# Patient Record
Sex: FEMALE | Race: OTHER | NOT HISPANIC OR LATINO | Employment: UNEMPLOYED | URBAN - METROPOLITAN AREA
[De-identification: names, ages, dates, MRNs, and addresses within clinical notes are randomized per-mention and may not be internally consistent; named-entity substitution may affect disease eponyms.]

---

## 2023-10-03 ENCOUNTER — PREP FOR PROCEDURE (OUTPATIENT)
Dept: MATERNAL FETAL MEDICINE | Facility: CLINIC | Age: 23
End: 2023-10-03
Payer: COMMERCIAL

## 2023-10-03 ENCOUNTER — ANCILLARY PROCEDURE (OUTPATIENT)
Dept: RADIOLOGY | Facility: CLINIC | Age: 23
End: 2023-10-03
Payer: COMMERCIAL

## 2023-10-03 ENCOUNTER — ROUTINE PRENATAL (OUTPATIENT)
Dept: MATERNAL FETAL MEDICINE | Facility: CLINIC | Age: 23
End: 2023-10-03
Payer: COMMERCIAL

## 2023-10-03 ENCOUNTER — TELEPHONE (OUTPATIENT)
Dept: MATERNAL FETAL MEDICINE | Facility: HOSPITAL | Age: 23
End: 2023-10-03

## 2023-10-03 VITALS — SYSTOLIC BLOOD PRESSURE: 118 MMHG | DIASTOLIC BLOOD PRESSURE: 86 MMHG | WEIGHT: 177.2 LBS

## 2023-10-03 DIAGNOSIS — O33.7XX0: Primary | ICD-10-CM

## 2023-10-03 DIAGNOSIS — O33.7XX0: ICD-10-CM

## 2023-10-03 DIAGNOSIS — Z33.2 TERMINATION OF PREGNANCY (FETUS): Primary | ICD-10-CM

## 2023-10-03 PROCEDURE — 76811 OB US DETAILED SNGL FETUS: CPT

## 2023-10-03 PROCEDURE — 99203 OFFICE O/P NEW LOW 30 MIN: CPT | Performed by: OBSTETRICS & GYNECOLOGY

## 2023-10-03 PROCEDURE — 76811 OB US DETAILED SNGL FETUS: CPT | Performed by: OBSTETRICS & GYNECOLOGY

## 2023-10-03 PROCEDURE — 99213 OFFICE O/P EST LOW 20 MIN: CPT | Mod: 25 | Performed by: OBSTETRICS & GYNECOLOGY

## 2023-10-03 NOTE — ASSESSMENT & PLAN NOTE
Patient here for second opinion and termination in a pregnancy complicated by fetal hydrops. Patient had an amnio which was negative for CMV, parvo, toxo, and normal FISH. WGS pending. Limited ultrasound performed today demonstrates significant hydrops with a fetus measuring 2w ahead of GERTRUDE due to tissue edema.   Patient taught back her understanding of the findings including the risks of stillbirth,  demise. Patient understands her options of termination vs expectant management and desires termination. Patient desires a termination to start her healing process and has another child at home to take care of. Does not want to accept the risks of possible pregnancy complications with this pregnancy including mirror syndrome.

## 2023-10-03 NOTE — ASSESSMENT & PLAN NOTE
"Patient counseled on her options of continuing the pregnancy, terminating the pregnancy and adoption and she has decided to terminate the pregnancy, due to threat to maternal life.  I described the nature and purpose of the termination of pregnancy (\"\") procedure, the risks associated with that procedure (bleeding, infection, perforation of the uterus), the probable gestational age of the embryo or fetus, and the medical risks associated with carrying a pregnancy to term.  24 hour consent and HB 59 forms were filled out.  The patient was offered copies of the [facility] Fetal Development Book and Pregnancy Resource Guide.  She was also informed on the availability of post-termination resources and counseling.  Instructions for follow-up were reviewed with the patient.    Patient to be scheduled for Kcl and laminaria placement tomorrow.     "

## 2023-10-03 NOTE — PROGRESS NOTES
10/3/2023  Brigid Lacey     CONSULT NOTE  Referring Clinician: No ref. provider found   Reason for consult: Hydrops second opinion and termination    HPI: Brigid Lacey is a 23 y.o.  at 21w4d here for consult for fetal hydrops.     Patient reports finding out about fetal hydrops at her anatomic survey last week and had an amnio which was negative to date. She desires termination     10 point review of system is negative except as above    OB History    Para Term  AB Living   2 1   1   1   SAB IAB Ectopic Multiple Live Births           1      # Outcome Date GA Lbr Ozzie/2nd Weight Sex Delivery Anes PTL Lv   2 Current            1  21 36w4d  2693 g F Vag-Spont None  JENNIE       No past medical history on file.  Past Surgical History:   Procedure Laterality Date    NO PAST SURGERIES       Medication Documentation Review Audit    **Prior to Admission medications have not yet been reviewed**       Not on File  Social History     Socioeconomic History    Marital status: Single     Spouse name: Not on file    Number of children: Not on file    Years of education: Not on file    Highest education level: Not on file   Occupational History    Not on file   Tobacco Use    Smoking status: Never    Smokeless tobacco: Never   Substance and Sexual Activity    Alcohol use: Never    Drug use: Never    Sexual activity: Not on file   Other Topics Concern    Not on file   Social History Narrative    Not on file     Social Determinants of Health     Financial Resource Strain: Not on file   Food Insecurity: Not on file   Transportation Needs: Not on file   Physical Activity: Not on file   Stress: Not on file   Social Connections: Not on file   Intimate Partner Violence: Not on file     No family history on file.    OBJECTIVE  Visit Vitals  /86 Comment:    Wt 80.4 kg (177 lb 3.2 oz)   LMP 2023   OB Status Pregnant   Smoking Status Never     ASSESSMENT & PLAN    Hydrops fetalis  Patient here for  "second opinion and termination in a pregnancy complicated by fetal hydrops. Patient had an amnio which was negative for CMV, parvo, toxo, and normal FISH. WGS pending. Limited ultrasound performed today demonstrates significant hydrops with a fetus measuring 2w ahead of GERTRUDE due to tissue edema.   Patient taught back her understanding of the findings including the risks of stillbirth,  demise. Patient understands her options of termination vs expectant management and desires termination. Patient desires a termination to start her healing process and has another child at home to take care of. Does not want to accept the risks of possible pregnancy complications with this pregnancy including mirror syndrome.    Termination of pregnancy (fetus)  Patient counseled on her options of continuing the pregnancy, terminating the pregnancy and adoption and she has decided to terminate the pregnancy, due to threat to maternal life.  I described the nature and purpose of the termination of pregnancy (\"\") procedure, the risks associated with that procedure (bleeding, infection, perforation of the uterus), the probable gestational age of the embryo or fetus, and the medical risks associated with carrying a pregnancy to term.  24 hour consent and HB 59 forms were filled out.  The patient was offered copies of the [facility] Fetal Development Book and Pregnancy Resource Guide.  She was also informed on the availability of post-termination resources and counseling.  Instructions for follow-up were reviewed with the patient.       No orders of the defined types were placed in this encounter.   Case request order through prep for procedure was placed      Turner Ingram MD   Maternal Fetal Medicine    "

## 2023-10-04 ENCOUNTER — ROUTINE PRENATAL (OUTPATIENT)
Dept: MATERNAL FETAL MEDICINE | Facility: HOSPITAL | Age: 23
End: 2023-10-04
Payer: COMMERCIAL

## 2023-10-04 ENCOUNTER — HOSPITAL ENCOUNTER (OUTPATIENT)
Facility: HOSPITAL | Age: 23
Setting detail: OUTPATIENT SURGERY
End: 2023-10-04
Attending: OBSTETRICS & GYNECOLOGY | Admitting: OBSTETRICS & GYNECOLOGY
Payer: COMMERCIAL

## 2023-10-04 ENCOUNTER — PREP FOR PROCEDURE (OUTPATIENT)
Dept: MATERNAL FETAL MEDICINE | Facility: CLINIC | Age: 23
End: 2023-10-04
Payer: COMMERCIAL

## 2023-10-04 DIAGNOSIS — Z33.2 TERMINATION OF PREGNANCY (FETUS): ICD-10-CM

## 2023-10-04 DIAGNOSIS — Z33.2 TERMINATION OF PREGNANCY (FETUS): Primary | ICD-10-CM

## 2023-10-04 PROCEDURE — 99214 OFFICE O/P EST MOD 30 MIN: CPT | Performed by: OBSTETRICS & GYNECOLOGY

## 2023-10-04 NOTE — PROGRESS NOTES
Patient ID: Brigid Lacey is a 23 y.o. female.  Patient presents for fetal digoxin injection for fetal hydrops not compatible with life and laminaria placement ahead of plan for D&E.    Written consent was obtained electronically.  Time out was performed.    Fetus visualized via ultrasound.  Patient's abdomen was prepped and draped in the usual sterile fashion. 2 g of digoxin was injected into the fetal chest with a 20 gauge spinal needle under ultrasound guidance.  No complications noted.    Attention was then turned to the laminaria placement.  A speculum was placed in the vagina, the cervix was visualized and cleaned with betadine swabs.  An injection of 2 mLof 1% lidocaine was injected at 12 'o'clock and a tenaculum was placed.  Additional 4 mL at 2'o'clock and 4 mL at 10'o'clock were injected at which time the laminaria were placed.  Four laminaria were placed but bleeding was noted at the injection site and patient reported severe cramping and requested cessation of procedure.  Laminaria were expelled, all were removed from the vagina.  Bleeding evaluated, pressure was held and the cervix was hemostatic.  Discussed with patient option of moving forward with the laminaria versus induction of labor in lieu of D&E, patient decided on induction of labor and will present by tomorrow for initiation of IOL.  All instruments were removed and counts were correct.  Patient was stable for discharge home.  Dr. Singleton was present for the entire procedure.    Reviewed and approved by SUE GALVEZ on 10/4/23 at 5:50 PM.

## 2023-10-05 ENCOUNTER — HOSPITAL ENCOUNTER (INPATIENT)
Facility: HOSPITAL | Age: 23
LOS: 1 days | Discharge: HOME | DRG: 779 | End: 2023-10-06
Attending: OBSTETRICS & GYNECOLOGY | Admitting: STUDENT IN AN ORGANIZED HEALTH CARE EDUCATION/TRAINING PROGRAM
Payer: COMMERCIAL

## 2023-10-05 ENCOUNTER — DOCUMENTATION (OUTPATIENT)
Dept: CASE MANAGEMENT | Facility: HOSPITAL | Age: 23
End: 2023-10-05
Payer: COMMERCIAL

## 2023-10-05 DIAGNOSIS — Z33.2 TERMINATION OF PREGNANCY (FETUS): Primary | ICD-10-CM

## 2023-10-05 LAB
ABO GROUP (TYPE) IN BLOOD: NORMAL
ABO GROUP (TYPE) IN BLOOD: NORMAL
ANTIBODY SCREEN: NORMAL
BB ANTIBODY IDENTIFICATION: NORMAL
CASE #: NORMAL
ERYTHROCYTE [DISTWIDTH] IN BLOOD BY AUTOMATED COUNT: 15.9 % (ref 11.5–14.5)
HCT VFR BLD AUTO: 27.8 % (ref 36–46)
HGB BLD-MCNC: 9.2 G/DL (ref 12–16)
MCH RBC QN AUTO: 31 PG (ref 26–34)
MCHC RBC AUTO-ENTMCNC: 33.1 G/DL (ref 32–36)
MCV RBC AUTO: 94 FL (ref 80–100)
NRBC BLD-RTO: 0 /100 WBCS (ref 0–0)
PATH REV-IMMUNOHEMATOLOGY-PR30: NORMAL
PLATELET # BLD AUTO: 205 X10*3/UL (ref 150–450)
PMV BLD AUTO: 10.3 FL (ref 7.5–11.5)
RBC # BLD AUTO: 2.97 X10*6/UL (ref 4–5.2)
RH FACTOR (ANTIGEN D): NORMAL
RH FACTOR (ANTIGEN D): NORMAL
T PALLIDUM AB SER QL: NONREACTIVE
WBC # BLD AUTO: 8.6 X10*3/UL (ref 4.4–11.3)

## 2023-10-05 PROCEDURE — 86922 COMPATIBILITY TEST ANTIGLOB: CPT

## 2023-10-05 PROCEDURE — 1120000001 HC OB PRIVATE ROOM DAILY

## 2023-10-05 PROCEDURE — 2500000004 HC RX 250 GENERAL PHARMACY W/ HCPCS (ALT 636 FOR OP/ED): Performed by: STUDENT IN AN ORGANIZED HEALTH CARE EDUCATION/TRAINING PROGRAM

## 2023-10-05 PROCEDURE — 86870 RBC ANTIBODY IDENTIFICATION: CPT

## 2023-10-05 PROCEDURE — 85027 COMPLETE CBC AUTOMATED: CPT | Performed by: STUDENT IN AN ORGANIZED HEALTH CARE EDUCATION/TRAINING PROGRAM

## 2023-10-05 PROCEDURE — 86901 BLOOD TYPING SEROLOGIC RH(D): CPT | Performed by: STUDENT IN AN ORGANIZED HEALTH CARE EDUCATION/TRAINING PROGRAM

## 2023-10-05 PROCEDURE — 86900 BLOOD TYPING SEROLOGIC ABO: CPT | Performed by: STUDENT IN AN ORGANIZED HEALTH CARE EDUCATION/TRAINING PROGRAM

## 2023-10-05 PROCEDURE — 2580000001 HC RX 258 IV SOLUTIONS: Performed by: STUDENT IN AN ORGANIZED HEALTH CARE EDUCATION/TRAINING PROGRAM

## 2023-10-05 PROCEDURE — 86780 TREPONEMA PALLIDUM: CPT | Performed by: STUDENT IN AN ORGANIZED HEALTH CARE EDUCATION/TRAINING PROGRAM

## 2023-10-05 PROCEDURE — 36415 COLL VENOUS BLD VENIPUNCTURE: CPT | Performed by: STUDENT IN AN ORGANIZED HEALTH CARE EDUCATION/TRAINING PROGRAM

## 2023-10-05 PROCEDURE — 86077 PHYS BLOOD BANK SERV XMATCH: CPT | Performed by: STUDENT IN AN ORGANIZED HEALTH CARE EDUCATION/TRAINING PROGRAM

## 2023-10-05 RX ORDER — METOCLOPRAMIDE 10 MG/1
10 TABLET ORAL EVERY 6 HOURS PRN
Status: DISCONTINUED | OUTPATIENT
Start: 2023-10-05 | End: 2023-10-06 | Stop reason: HOSPADM

## 2023-10-05 RX ORDER — SODIUM CHLORIDE, SODIUM LACTATE, POTASSIUM CHLORIDE, CALCIUM CHLORIDE 600; 310; 30; 20 MG/100ML; MG/100ML; MG/100ML; MG/100ML
125 INJECTION, SOLUTION INTRAVENOUS CONTINUOUS
Status: DISCONTINUED | OUTPATIENT
Start: 2023-10-05 | End: 2023-10-06 | Stop reason: HOSPADM

## 2023-10-05 RX ORDER — TERBUTALINE SULFATE 1 MG/ML
0.25 INJECTION SUBCUTANEOUS ONCE AS NEEDED
Status: DISCONTINUED | OUTPATIENT
Start: 2023-10-05 | End: 2023-10-06 | Stop reason: HOSPADM

## 2023-10-05 RX ORDER — TRANEXAMIC ACID 100 MG/ML
1000 INJECTION, SOLUTION INTRAVENOUS ONCE AS NEEDED
Status: DISCONTINUED | OUTPATIENT
Start: 2023-10-05 | End: 2023-10-06 | Stop reason: HOSPADM

## 2023-10-05 RX ORDER — KETOROLAC TROMETHAMINE 15 MG/ML
30 INJECTION, SOLUTION INTRAMUSCULAR; INTRAVENOUS EVERY 6 HOURS PRN
Status: DISCONTINUED | OUTPATIENT
Start: 2023-10-05 | End: 2023-10-06 | Stop reason: HOSPADM

## 2023-10-05 RX ORDER — MISOPROSTOL 200 UG/1
400 TABLET ORAL
Status: DISPENSED | OUTPATIENT
Start: 2023-10-05 | End: 2023-10-06

## 2023-10-05 RX ORDER — HYDRALAZINE HYDROCHLORIDE 20 MG/ML
5 INJECTION INTRAMUSCULAR; INTRAVENOUS ONCE AS NEEDED
Status: DISCONTINUED | OUTPATIENT
Start: 2023-10-05 | End: 2023-10-06 | Stop reason: HOSPADM

## 2023-10-05 RX ORDER — OXYTOCIN 10 [USP'U]/ML
10 INJECTION, SOLUTION INTRAMUSCULAR; INTRAVENOUS ONCE AS NEEDED
Status: DISCONTINUED | OUTPATIENT
Start: 2023-10-05 | End: 2023-10-06 | Stop reason: HOSPADM

## 2023-10-05 RX ORDER — CARBOPROST TROMETHAMINE 250 UG/ML
250 INJECTION, SOLUTION INTRAMUSCULAR ONCE AS NEEDED
Status: COMPLETED | OUTPATIENT
Start: 2023-10-05 | End: 2023-10-06

## 2023-10-05 RX ORDER — LOPERAMIDE HYDROCHLORIDE 2 MG/1
4 CAPSULE ORAL EVERY 2 HOUR PRN
Status: DISCONTINUED | OUTPATIENT
Start: 2023-10-05 | End: 2023-10-06 | Stop reason: HOSPADM

## 2023-10-05 RX ORDER — OXYTOCIN/0.9 % SODIUM CHLORIDE 30/500 ML
60 PLASTIC BAG, INJECTION (ML) INTRAVENOUS
Status: DISCONTINUED | OUTPATIENT
Start: 2023-10-05 | End: 2023-10-06 | Stop reason: HOSPADM

## 2023-10-05 RX ORDER — METOCLOPRAMIDE HYDROCHLORIDE 5 MG/ML
10 INJECTION INTRAMUSCULAR; INTRAVENOUS EVERY 6 HOURS PRN
Status: DISCONTINUED | OUTPATIENT
Start: 2023-10-05 | End: 2023-10-06 | Stop reason: HOSPADM

## 2023-10-05 RX ORDER — MISOPROSTOL 200 UG/1
800 TABLET ORAL ONCE AS NEEDED
Status: DISCONTINUED | OUTPATIENT
Start: 2023-10-05 | End: 2023-10-06 | Stop reason: HOSPADM

## 2023-10-05 RX ORDER — HYDROMORPHONE HYDROCHLORIDE 1 MG/ML
0.4 INJECTION, SOLUTION INTRAMUSCULAR; INTRAVENOUS; SUBCUTANEOUS EVERY 2 HOUR PRN
Status: DISCONTINUED | OUTPATIENT
Start: 2023-10-05 | End: 2023-10-06 | Stop reason: HOSPADM

## 2023-10-05 RX ORDER — ONDANSETRON HYDROCHLORIDE 2 MG/ML
4 INJECTION, SOLUTION INTRAVENOUS EVERY 6 HOURS PRN
Status: DISCONTINUED | OUTPATIENT
Start: 2023-10-05 | End: 2023-10-06 | Stop reason: HOSPADM

## 2023-10-05 RX ORDER — NIFEDIPINE 10 MG/1
10 CAPSULE ORAL ONCE AS NEEDED
Status: DISCONTINUED | OUTPATIENT
Start: 2023-10-05 | End: 2023-10-06 | Stop reason: HOSPADM

## 2023-10-05 RX ORDER — ONDANSETRON 4 MG/1
4 TABLET, FILM COATED ORAL EVERY 6 HOURS PRN
Status: DISCONTINUED | OUTPATIENT
Start: 2023-10-05 | End: 2023-10-06 | Stop reason: HOSPADM

## 2023-10-05 RX ORDER — LIDOCAINE HYDROCHLORIDE 10 MG/ML
30 INJECTION INFILTRATION; PERINEURAL ONCE AS NEEDED
Status: DISCONTINUED | OUTPATIENT
Start: 2023-10-05 | End: 2023-10-06 | Stop reason: HOSPADM

## 2023-10-05 RX ORDER — LABETALOL HYDROCHLORIDE 5 MG/ML
20 INJECTION, SOLUTION INTRAVENOUS ONCE AS NEEDED
Status: DISCONTINUED | OUTPATIENT
Start: 2023-10-05 | End: 2023-10-06 | Stop reason: HOSPADM

## 2023-10-05 RX ORDER — METHYLERGONOVINE MALEATE 0.2 MG/ML
0.2 INJECTION INTRAVENOUS ONCE AS NEEDED
Status: DISCONTINUED | OUTPATIENT
Start: 2023-10-05 | End: 2023-10-06 | Stop reason: HOSPADM

## 2023-10-05 RX ADMIN — HYDROMORPHONE HYDROCHLORIDE 0.4 MG: 1 INJECTION, SOLUTION INTRAMUSCULAR; INTRAVENOUS; SUBCUTANEOUS at 08:59

## 2023-10-05 RX ADMIN — MISOPROSTOL 400 MCG: 200 TABLET ORAL at 12:03

## 2023-10-05 RX ADMIN — MISOPROSTOL 400 MCG: 200 TABLET ORAL at 03:46

## 2023-10-05 RX ADMIN — SODIUM CHLORIDE, POTASSIUM CHLORIDE, SODIUM LACTATE AND CALCIUM CHLORIDE 125 ML/HR: 600; 310; 30; 20 INJECTION, SOLUTION INTRAVENOUS at 03:49

## 2023-10-05 RX ADMIN — HYDROMORPHONE HYDROCHLORIDE 0.4 MG: 1 INJECTION, SOLUTION INTRAMUSCULAR; INTRAVENOUS; SUBCUTANEOUS at 18:17

## 2023-10-05 RX ADMIN — SODIUM CHLORIDE, POTASSIUM CHLORIDE, SODIUM LACTATE AND CALCIUM CHLORIDE 125 ML/HR: 600; 310; 30; 20 INJECTION, SOLUTION INTRAVENOUS at 21:28

## 2023-10-05 RX ADMIN — MISOPROSTOL 400 MCG: 200 TABLET ORAL at 16:19

## 2023-10-05 RX ADMIN — SODIUM CHLORIDE, POTASSIUM CHLORIDE, SODIUM LACTATE AND CALCIUM CHLORIDE 125 ML/HR: 600; 310; 30; 20 INJECTION, SOLUTION INTRAVENOUS at 11:12

## 2023-10-05 RX ADMIN — MISOPROSTOL 400 MCG: 200 TABLET ORAL at 07:57

## 2023-10-05 RX ADMIN — HYDROMORPHONE HYDROCHLORIDE 0.4 MG: 1 INJECTION, SOLUTION INTRAMUSCULAR; INTRAVENOUS; SUBCUTANEOUS at 12:08

## 2023-10-05 RX ADMIN — MISOPROSTOL 400 MCG: 200 TABLET ORAL at 21:59

## 2023-10-05 SDOH — HEALTH STABILITY: MENTAL HEALTH: HOW OFTEN DO YOU HAVE 6 OR MORE DRINKS ON ONE OCCASION?: NEVER

## 2023-10-05 SDOH — SOCIAL STABILITY: SOCIAL INSECURITY: PHYSICAL ABUSE: DENIES

## 2023-10-05 SDOH — HEALTH STABILITY: MENTAL HEALTH: HAVE YOU USED ANY PRESCRIPTION DRUGS OTHER THAN PRESCRIBED IN THE PAST 12 MONTHS?: NO

## 2023-10-05 SDOH — HEALTH STABILITY: MENTAL HEALTH: NON-SPECIFIC ACTIVE SUICIDAL THOUGHTS (PAST 1 MONTH): NO

## 2023-10-05 SDOH — ECONOMIC STABILITY: FOOD INSECURITY: WITHIN THE PAST 12 MONTHS, YOU WORRIED THAT YOUR FOOD WOULD RUN OUT BEFORE YOU GOT MONEY TO BUY MORE.: NEVER TRUE

## 2023-10-05 SDOH — SOCIAL STABILITY: SOCIAL INSECURITY: ARE THERE ANY APPARENT SIGNS OF INJURIES/BEHAVIORS THAT COULD BE RELATED TO ABUSE/NEGLECT?: NO

## 2023-10-05 SDOH — HEALTH STABILITY: MENTAL HEALTH: WERE YOU ABLE TO COMPLETE ALL THE BEHAVIORAL HEALTH SCREENINGS?: NO

## 2023-10-05 SDOH — HEALTH STABILITY: MENTAL HEALTH: HOW OFTEN DO YOU HAVE A DRINK CONTAINING ALCOHOL?: NEVER

## 2023-10-05 SDOH — HEALTH STABILITY: MENTAL HEALTH: HOW MANY STANDARD DRINKS CONTAINING ALCOHOL DO YOU HAVE ON A TYPICAL DAY?: PATIENT DOES NOT DRINK

## 2023-10-05 SDOH — SOCIAL STABILITY: SOCIAL INSECURITY
WITHIN THE LAST YEAR, HAVE TO BEEN RAPED OR FORCED TO HAVE ANY KIND OF SEXUAL ACTIVITY BY YOUR PARTNER OR EX-PARTNER?: NO

## 2023-10-05 SDOH — SOCIAL STABILITY: SOCIAL INSECURITY: DO YOU FEEL ANYONE HAS EXPLOITED OR TAKEN ADVANTAGE OF YOU FINANCIALLY OR OF YOUR PERSONAL PROPERTY?: NO

## 2023-10-05 SDOH — SOCIAL STABILITY: SOCIAL INSECURITY: WITHIN THE LAST YEAR, HAVE YOU BEEN AFRAID OF YOUR PARTNER OR EX-PARTNER?: NO

## 2023-10-05 SDOH — SOCIAL STABILITY: SOCIAL INSECURITY: WITHIN THE LAST YEAR, HAVE YOU BEEN HUMILIATED OR EMOTIONALLY ABUSED IN OTHER WAYS BY YOUR PARTNER OR EX-PARTNER?: NO

## 2023-10-05 SDOH — ECONOMIC STABILITY: FOOD INSECURITY: WITHIN THE PAST 12 MONTHS, THE FOOD YOU BOUGHT JUST DIDN'T LAST AND YOU DIDN'T HAVE MONEY TO GET MORE.: NEVER TRUE

## 2023-10-05 SDOH — SOCIAL STABILITY: SOCIAL INSECURITY
WITHIN THE LAST YEAR, HAVE YOU BEEN KICKED, HIT, SLAPPED, OR OTHERWISE PHYSICALLY HURT BY YOUR PARTNER OR EX-PARTNER?: NO

## 2023-10-05 SDOH — HEALTH STABILITY: MENTAL HEALTH: SUICIDAL BEHAVIOR (LIFETIME): NO

## 2023-10-05 SDOH — SOCIAL STABILITY: SOCIAL INSECURITY: ABUSE SCREEN: ADULT

## 2023-10-05 SDOH — SOCIAL STABILITY: SOCIAL INSECURITY: HAS ANYONE EVER THREATENED TO HURT YOUR FAMILY OR YOUR PETS?: NO

## 2023-10-05 SDOH — SOCIAL STABILITY: SOCIAL INSECURITY: ARE YOU OR HAVE YOU BEEN THREATENED OR ABUSED PHYSICALLY, EMOTIONALLY, OR SEXUALLY BY ANYONE?: NO

## 2023-10-05 SDOH — HEALTH STABILITY: MENTAL HEALTH: WISH TO BE DEAD (PAST 1 MONTH): NO

## 2023-10-05 SDOH — SOCIAL STABILITY: SOCIAL INSECURITY: VERBAL ABUSE: DENIES

## 2023-10-05 SDOH — ECONOMIC STABILITY: HOUSING INSECURITY: DO YOU FEEL UNSAFE GOING BACK TO THE PLACE WHERE YOU ARE LIVING?: NO

## 2023-10-05 SDOH — ECONOMIC STABILITY: TRANSPORTATION INSECURITY
IN THE PAST 12 MONTHS, HAS LACK OF TRANSPORTATION KEPT YOU FROM MEETINGS, WORK, OR FROM GETTING THINGS NEEDED FOR DAILY LIVING?: NO

## 2023-10-05 SDOH — ECONOMIC STABILITY: TRANSPORTATION INSECURITY
IN THE PAST 12 MONTHS, HAS THE LACK OF TRANSPORTATION KEPT YOU FROM MEDICAL APPOINTMENTS OR FROM GETTING MEDICATIONS?: NO

## 2023-10-05 SDOH — SOCIAL STABILITY: SOCIAL INSECURITY: DOES ANYONE TRY TO KEEP YOU FROM HAVING/CONTACTING OTHER FRIENDS OR DOING THINGS OUTSIDE YOUR HOME?: NO

## 2023-10-05 SDOH — HEALTH STABILITY: MENTAL HEALTH: HAVE YOU USED ANY SUBSTANCES (CANABIS, COCAINE, HEROIN, HALLUCINOGENS, INHALANTS, ETC.) IN THE PAST 12 MONTHS?: NO

## 2023-10-05 ASSESSMENT — LIFESTYLE VARIABLES
AUDIT-C TOTAL SCORE: 0
AUDIT-C TOTAL SCORE: 0
HOW OFTEN DO YOU HAVE 6 OR MORE DRINKS ON ONE OCCASION: NEVER
SKIP TO QUESTIONS 9-10: 1
HOW MANY STANDARD DRINKS CONTAINING ALCOHOL DO YOU HAVE ON A TYPICAL DAY: PATIENT DOES NOT DRINK
AUDIT-C TOTAL SCORE: 0
SKIP TO QUESTIONS 9-10: 1
HOW OFTEN DO YOU HAVE A DRINK CONTAINING ALCOHOL: NEVER

## 2023-10-05 ASSESSMENT — PAIN SCALES - GENERAL
PAINLEVEL_OUTOF10: 8
PAINLEVEL_OUTOF10: 3
PAINLEVEL_OUTOF10: 1
PAINLEVEL_OUTOF10: 0 - NO PAIN
PAINLEVEL_OUTOF10: 2
PAINLEVEL_OUTOF10: 8
PAINLEVEL_OUTOF10: 2
PAINLEVEL_OUTOF10: 8
PAINLEVEL_OUTOF10: 0 - NO PAIN
PAINLEVEL_OUTOF10: 2

## 2023-10-05 NOTE — H&P
Obstetrical Admission History and Physical     Brigid Lacey is a 23 y.o.  at 21w6d. GERTRUDE: 2024, by Last Menstrual Period presenting for IOL in the setting of fetal hydrops.    Chief Complaint: fetal loss    Assessment/Plan      IOL, IUFD  - Patient with fetal hydrops diagnosed on anatomy ultrasound with negative amniocentesis  - s/p MFM consult: given risk to maternal life risk of mirror syndrome, patient opted for termination. Now s/p fetal digoxin injection and unsuccessful laminaria placement, with patient now opting for IOL  - Bedside ultrasound with hydropic fetus in cephalic presentation, no cardiac activity visualized  - Patient is s/p genetic testing with amniocentesis with pending whole genome sequencing. Patient desires autopsy, undecided on disposition. Plan to contact mortician after delivery.   - Patient amenable to bereavement support with Mariel Camacho  - Cytotec 400mcg placed intravaginally, for repeat dose in 4 hours  - Discussed we do not recommend  given increased maternal risk associated with  vs vaginal delivery without benefit to fetus in this setting. Patient expressed understanding.  - Discussed increased risk for retained placenta and need for D&C after delivery  - Hgb 9.2 on admission, T&C 2u pRBC  - Patient uncertain about what she would like at delivery and whether she wants to hold Scarlet. Will continue to check in and reassess.    Principal Problem:    Fetal hydrops      Pregnancy Problems (from 10/03/23 to present)       Problem Noted Resolved    Hydrops fetalis 10/3/2023 by Turner Ingram MD No    Priority:  Medium      Overview Addendum 10/5/2023  2:57 AM by Latosha Wild MD     THANIA from [facility] where it was diagnosed at 20w.  Patient underwent an amnio: Negative for Parvo, CMV, Toxo, FISH 46XX  Desires termination           Termination of pregnancy (fetus) 10/3/2023 by Turner Ingram MD No    Priority:  Medium      Overview Signed 10/3/2023  3:37 PM by Turner  MD Nataly     Hydrops - skin edema, pleural effusion, pericardial effusion, ascites               Options for delivery have been discussed with the patient and she elects for an induction of labor.  Cervical ripening with cytotec, cervidil, other prostaglandin agents has been discussed.  The risks, benefits, complications, alternatives, expected outcomes, potential problems during recuperation and recovery, and the risks of not performing the procedure were discussed with the patient. All questions were answered. There was concurrence with the planned procedure, and the patient wanted to proceed    Admit to inpatient status. I anticipate that this patient will require a stay exceeding at least 2 midnights for delivery and postpartum.  Induction of labor.  Management of pregnancy complications, as indicated.    Subjective   Patient sent in by [facility] for IOL in the setting of fetal hydrops incompatible with life, s/p fetal digoxin injection. Coping appropriately.     Reason for Induction of Labor:  Fetal loss       Obstetrical History   OB History    Para Term  AB Living   2 1   1   1   SAB IAB Ectopic Multiple Live Births           1      # Outcome Date GA Lbr Ozzie/2nd Weight Sex Delivery Anes PTL Lv   2 Current            1  21 36w4d  2693 g F Vag-Spont None  JENNIE       Past Medical History  History reviewed. No pertinent past medical history.     Past Surgical History   Past Surgical History:   Procedure Laterality Date    NO PAST SURGERIES         Social History  Social History     Tobacco Use    Smoking status: Never    Smokeless tobacco: Never   Substance Use Topics    Alcohol use: Never     Substance and Sexual Activity   Drug Use Never       Allergies  Patient has no known allergies.     Medications  No medications prior to admission.       Objective    Last Vitals  Temp Pulse Resp BP MAP O2 Sat   36.7 °C (98.1 °F) 99 18 115/79   99 %     Physical Examination  General: no acute  "distress  HEENT: normocephalic, atraumatic  Heart: warm and well perfused  Lungs: clear to auscultation bilaterally  Abdomen: gravid  Extremities: moving all extremities  Neuro: awake and conversant  Psych: appropriate mood and affect  SVE: 0/0/-3  Bedside ultrasound with hydropic fetus in cephalic presentation, no cardiac activity visualized    Lab Review  No results found for: \"ABO\", \"LABRH\", \"ABSCRN\"  Lab Results   Component Value Date    WBC 8.6 10/05/2023    HGB 9.2 (L) 10/05/2023    HCT 27.8 (L) 10/05/2023     10/05/2023       "

## 2023-10-05 NOTE — PROGRESS NOTES
Intrapartum Progress Note    Assessment/Plan   Brigid Lacey is a 23 y.o.  at 21w6d. GERTRUDE: 2024, by Last Menstrual Period presenting for IOL in the setting of fetal hydrops.     IOL, IUFD  - Patient with fetal hydrops diagnosed on anatomy ultrasound with negative amniocentesis  - s/p MFM consult: given risk to maternal life risk of mirror syndrome, patient opted for termination. Now s/p fetal digoxin injection and unsuccessful laminaria placement, with patient now opting for IOL  - Bedside ultrasound with hydropic fetus in cephalic presentation, no cardiac activity visualized on admission   - Patient is s/p genetic testing with amniocentesis with pending whole genome sequencing. Patient desires autopsy, undecided on disposition. Plan to contact mortician after delivery.   - Patient amenable to bereavement support with Mariel Camacho  - Cytotec 400mcg placed intravaginally now x3 doses   - Discussed we do not recommend  given increased maternal risk associated with  vs vaginal delivery without benefit to fetus in this setting. Patient expressed understanding.  - Discussed increased risk for retained placenta and need for D&C after delivery  - Hgb 9.2 on admission, T&C 2u pRBC  - Patient uncertain about what she would like at delivery and whether she wants to hold Scarlet. Will continue to check in and reassess.   - pain control with toradol/dilaudid, neuraxial anesthesia as desired   - Bereavement Shellie Camacho consulted, appreciate care     Principal Problem:    Fetal hydrops    Pregnancy Problems (from 10/03/23 to present)       Problem Noted Resolved    Hydrops fetalis 10/3/2023 by Turner Ingram MD No    Priority:  Medium      Overview Addendum 10/5/2023  2:57 AM by Latosha Wild MD     THANIA from [facility] where it was diagnosed at 20w.  Patient underwent an amnio: Negative for Parvo, CMV, Toxo, FISH 46XX  Desires termination           Termination of pregnancy (fetus) 10/3/2023 by Turner Ingram MD  No    Priority:  Medium      Overview Signed 10/3/2023  3:37 PM by Turner Ingram MD     Hydrops - skin edema, pleural effusion, pericardial effusion, ascites                 Subjective   Feeling some cramping/pressure     Objective   Last Vitals:  Temp Pulse Resp BP MAP Pulse Ox   36.4 °C (97.5 °F) 108 18 127/75 96 mmHg 98 %     Vitals Min/Max Last 24 Hours:  Temp  Min: 36.4 °C (97.5 °F)  Max: 37.2 °C (99 °F)  Pulse  Min: 91  Max: 120  Resp  Min: 16  Max: 18  BP  Min: 113/72  Max: 127/75  MAP  Min: 93 mmHg  Max: 96 mmHg    Intake/Output:    Intake/Output Summary (Last 24 hours) at 10/5/2023 1453  Last data filed at 10/5/2023 1200  Gross per 24 hour   Intake 1022.92 ml   Output --   Net 1022.92 ml       Physical Examination:  GENERAL: Examination reveals a well developed, well nourished, gravid female in no acute distress. She is alert and cooperative.  HEENT: PERRLA. External ears normal. Nose normal, no erythema or discharge. Mouth and throat clear.  ABDOMEN: soft, gravid, nontender, nondistended, no abnormal masses, no epigastric pain  CERVIX: 1 cm dilated, 50% effaced, -3 station; MEMBRANES are Intact  EXTREMITIES: no redness or tenderness in the calves or thighs, no edema  SKIN: normal coloration and turgor, no rashes  NEUROLOGICAL: alert, oriented, normal speech, no focal findings or movement disorder noted  PSYCHOLOGICAL: awake and alert; oriented to person, place, and time    Lab Review:  Labs in chart were reviewed.

## 2023-10-05 NOTE — SIGNIFICANT EVENT
Patient states that she is feeling more discomfort, states that she has had an increase in cramping which she rates a 8/10 in pain. Requesting pain medication, hot packs also provided for additional pain relief. Significant other at bedside offering support, call light within reach.

## 2023-10-05 NOTE — PROGRESS NOTES
10/5/23: Received referral from OB resident, Dr. Wild, to see pt for bereavement support. Pt is a  who is admitted to L&D for IOL for IUFD secondary to fetal hydrops. She is 21.6 wga.   Met with Brigid today at bedside along with her partner. They also have a 2 year old daughter at home. Per Brigid, they have a lot of good support from their family. Brigid shared that they are extremely sad but trying to make it through today so they can start processing everything that has happened over the past week as they didn't know anything was wrong with the baby until her routine ultrasound last week. She shared that she feels like she is in a mental fog right now. Provided Brigid and her partner with support. Provided them with resources for their daughter at home as well as for themselves. Provided them with my direct contact information in order to follow up with them once they return home.    PLAN: Will remain available for support while inpatient and will follow for bereavement support/resources upon discharge home which pt is amenable to.    Mariel Camacho, MANJULA, KENZIE, CGP  [employer] [facility] [facility] Director of Parent Bereavement Programs  [phone]

## 2023-10-06 ENCOUNTER — DOCUMENTATION (OUTPATIENT)
Dept: CASE MANAGEMENT | Facility: HOSPITAL | Age: 23
End: 2023-10-06
Payer: COMMERCIAL

## 2023-10-06 VITALS
TEMPERATURE: 98.2 F | SYSTOLIC BLOOD PRESSURE: 119 MMHG | OXYGEN SATURATION: 98 % | HEART RATE: 94 BPM | BODY MASS INDEX: 29.49 KG/M2 | DIASTOLIC BLOOD PRESSURE: 77 MMHG | RESPIRATION RATE: 18 BRPM | HEIGHT: 65 IN

## 2023-10-06 LAB — FETAL MATERNAL SCREEN: NORMAL

## 2023-10-06 PROCEDURE — 2500000001 HC RX 250 WO HCPCS SELF ADMINISTERED DRUGS (ALT 637 FOR MEDICARE OP): Performed by: STUDENT IN AN ORGANIZED HEALTH CARE EDUCATION/TRAINING PROGRAM

## 2023-10-06 PROCEDURE — 96372 THER/PROPH/DIAG INJ SC/IM: CPT | Performed by: STUDENT IN AN ORGANIZED HEALTH CARE EDUCATION/TRAINING PROGRAM

## 2023-10-06 PROCEDURE — 59409 OBSTETRICAL CARE: CPT | Performed by: OBSTETRICS & GYNECOLOGY

## 2023-10-06 PROCEDURE — 85461 HEMOGLOBIN FETAL: CPT

## 2023-10-06 PROCEDURE — 7100000016 HC LABOR RECOVERY PER HOUR

## 2023-10-06 PROCEDURE — 88305 TISSUE EXAM BY PATHOLOGIST: CPT | Mod: TC,SUR,CMCLAB | Performed by: STUDENT IN AN ORGANIZED HEALTH CARE EDUCATION/TRAINING PROGRAM

## 2023-10-06 PROCEDURE — 7210000002 HC LABOR PER HOUR

## 2023-10-06 PROCEDURE — 36415 COLL VENOUS BLD VENIPUNCTURE: CPT | Performed by: STUDENT IN AN ORGANIZED HEALTH CARE EDUCATION/TRAINING PROGRAM

## 2023-10-06 PROCEDURE — 88305 TISSUE EXAM BY PATHOLOGIST: CPT | Performed by: STUDENT IN AN ORGANIZED HEALTH CARE EDUCATION/TRAINING PROGRAM

## 2023-10-06 PROCEDURE — 2500000004 HC RX 250 GENERAL PHARMACY W/ HCPCS (ALT 636 FOR OP/ED): Performed by: STUDENT IN AN ORGANIZED HEALTH CARE EDUCATION/TRAINING PROGRAM

## 2023-10-06 PROCEDURE — 10A07ZX ABORTION OF PRODUCTS OF CONCEPTION, ABORTIFACIENT, VIA NATURAL OR ARTIFICIAL OPENING: ICD-10-PCS | Performed by: STUDENT IN AN ORGANIZED HEALTH CARE EDUCATION/TRAINING PROGRAM

## 2023-10-06 PROCEDURE — 2500000005 HC RX 250 GENERAL PHARMACY W/O HCPCS: Performed by: STUDENT IN AN ORGANIZED HEALTH CARE EDUCATION/TRAINING PROGRAM

## 2023-10-06 RX ORDER — OXYTOCIN/0.9 % SODIUM CHLORIDE 30/500 ML
60 PLASTIC BAG, INJECTION (ML) INTRAVENOUS
Status: CANCELLED | OUTPATIENT
Start: 2023-10-06

## 2023-10-06 RX ORDER — ADHESIVE BANDAGE
10 BANDAGE TOPICAL
Status: CANCELLED | OUTPATIENT
Start: 2023-10-06

## 2023-10-06 RX ORDER — ONDANSETRON 4 MG/1
4 TABLET, FILM COATED ORAL
COMMUNITY
Start: 2022-08-08

## 2023-10-06 RX ORDER — LOPERAMIDE HYDROCHLORIDE 2 MG/1
4 CAPSULE ORAL EVERY 2 HOUR PRN
Status: CANCELLED | OUTPATIENT
Start: 2023-10-06

## 2023-10-06 RX ORDER — ONDANSETRON 4 MG/1
4 TABLET, FILM COATED ORAL EVERY 6 HOURS PRN
Status: CANCELLED | OUTPATIENT
Start: 2023-10-06

## 2023-10-06 RX ORDER — HYDRALAZINE HYDROCHLORIDE 20 MG/ML
5 INJECTION INTRAMUSCULAR; INTRAVENOUS ONCE AS NEEDED
Status: CANCELLED | OUTPATIENT
Start: 2023-10-06

## 2023-10-06 RX ORDER — LABETALOL HYDROCHLORIDE 5 MG/ML
20 INJECTION, SOLUTION INTRAVENOUS ONCE AS NEEDED
Status: CANCELLED | OUTPATIENT
Start: 2023-10-06

## 2023-10-06 RX ORDER — SIMETHICONE 80 MG
80 TABLET,CHEWABLE ORAL 4 TIMES DAILY PRN
Status: CANCELLED | OUTPATIENT
Start: 2023-10-06

## 2023-10-06 RX ORDER — LIDOCAINE 560 MG/1
1 PATCH PERCUTANEOUS; TOPICAL; TRANSDERMAL
Status: CANCELLED | OUTPATIENT
Start: 2023-10-06

## 2023-10-06 RX ORDER — DIPHENHYDRAMINE HCL 25 MG
25 CAPSULE ORAL EVERY 6 HOURS PRN
Status: CANCELLED | OUTPATIENT
Start: 2023-10-06

## 2023-10-06 RX ORDER — IBUPROFEN 600 MG/1
600 TABLET ORAL EVERY 6 HOURS SCHEDULED
Status: DISCONTINUED | OUTPATIENT
Start: 2023-10-06 | End: 2023-10-06 | Stop reason: HOSPADM

## 2023-10-06 RX ORDER — PETROLATUM,WHITE 41 %
OINTMENT (GRAM) TOPICAL
COMMUNITY
Start: 2022-11-15

## 2023-10-06 RX ORDER — DIPHENHYDRAMINE HYDROCHLORIDE 50 MG/ML
25 INJECTION INTRAMUSCULAR; INTRAVENOUS EVERY 6 HOURS PRN
Status: CANCELLED | OUTPATIENT
Start: 2023-10-06

## 2023-10-06 RX ORDER — CARBOPROST TROMETHAMINE 250 UG/ML
250 INJECTION, SOLUTION INTRAMUSCULAR ONCE
Status: COMPLETED | OUTPATIENT
Start: 2023-10-06 | End: 2023-10-06

## 2023-10-06 RX ORDER — NIFEDIPINE 10 MG/1
10 CAPSULE ORAL ONCE AS NEEDED
Status: CANCELLED | OUTPATIENT
Start: 2023-10-06

## 2023-10-06 RX ORDER — POLYETHYLENE GLYCOL 3350 17 G/17G
17 POWDER, FOR SOLUTION ORAL 2 TIMES DAILY PRN
Status: CANCELLED | OUTPATIENT
Start: 2023-10-06

## 2023-10-06 RX ORDER — OXYTOCIN 10 [USP'U]/ML
10 INJECTION, SOLUTION INTRAMUSCULAR; INTRAVENOUS ONCE AS NEEDED
Status: CANCELLED | OUTPATIENT
Start: 2023-10-06

## 2023-10-06 RX ORDER — POLYETHYLENE GLYCOL 3350 17 G/17G
17 POWDER, FOR SOLUTION ORAL DAILY
Qty: 527 G | Refills: 2 | Status: SHIPPED | OUTPATIENT
Start: 2023-10-06

## 2023-10-06 RX ORDER — TRANEXAMIC ACID 100 MG/ML
1000 INJECTION, SOLUTION INTRAVENOUS ONCE AS NEEDED
Status: CANCELLED | OUTPATIENT
Start: 2023-10-06

## 2023-10-06 RX ORDER — METHYLERGONOVINE MALEATE 0.2 MG/ML
0.2 INJECTION INTRAVENOUS ONCE AS NEEDED
Status: CANCELLED | OUTPATIENT
Start: 2023-10-06

## 2023-10-06 RX ORDER — BISACODYL 10 MG/1
10 SUPPOSITORY RECTAL DAILY PRN
Status: CANCELLED | OUTPATIENT
Start: 2023-10-06

## 2023-10-06 RX ORDER — HYDROCORTISONE 25 MG/G
OINTMENT TOPICAL 2 TIMES DAILY
COMMUNITY
Start: 2022-11-15

## 2023-10-06 RX ORDER — IBUPROFEN 600 MG/1
600 TABLET ORAL EVERY 6 HOURS SCHEDULED
Qty: 90 TABLET | Refills: 3 | Status: SHIPPED | OUTPATIENT
Start: 2023-10-06

## 2023-10-06 RX ORDER — ACETAMINOPHEN 325 MG/1
975 TABLET ORAL EVERY 6 HOURS SCHEDULED
Status: DISCONTINUED | OUTPATIENT
Start: 2023-10-06 | End: 2023-10-06 | Stop reason: HOSPADM

## 2023-10-06 RX ORDER — BENZONATATE 200 MG/1
1 CAPSULE ORAL 3 TIMES DAILY PRN
COMMUNITY
Start: 2022-06-05

## 2023-10-06 RX ORDER — CARBOPROST TROMETHAMINE 250 UG/ML
250 INJECTION, SOLUTION INTRAMUSCULAR ONCE AS NEEDED
Status: CANCELLED | OUTPATIENT
Start: 2023-10-06

## 2023-10-06 RX ORDER — ONDANSETRON HYDROCHLORIDE 2 MG/ML
4 INJECTION, SOLUTION INTRAVENOUS EVERY 6 HOURS PRN
Status: CANCELLED | OUTPATIENT
Start: 2023-10-06

## 2023-10-06 RX ORDER — MISOPROSTOL 200 UG/1
800 TABLET ORAL ONCE AS NEEDED
Status: CANCELLED | OUTPATIENT
Start: 2023-10-06

## 2023-10-06 RX ORDER — ACETAMINOPHEN 325 MG/1
975 TABLET ORAL EVERY 6 HOURS SCHEDULED
Qty: 90 TABLET | Refills: 3 | Status: SHIPPED | OUTPATIENT
Start: 2023-10-06

## 2023-10-06 RX ADMIN — HYDROMORPHONE HYDROCHLORIDE 0.4 MG: 1 INJECTION, SOLUTION INTRAMUSCULAR; INTRAVENOUS; SUBCUTANEOUS at 01:06

## 2023-10-06 RX ADMIN — CARBOPROST TROMETHAMINE 250 MCG: 250 INJECTION INTRAMUSCULAR at 03:05

## 2023-10-06 RX ADMIN — LOPERAMIDE HYDROCHLORIDE 4 MG: 2 CAPSULE ORAL at 01:41

## 2023-10-06 RX ADMIN — CARBOPROST TROMETHAMINE 250 MCG: 250 INJECTION, SOLUTION INTRAMUSCULAR at 01:40

## 2023-10-06 ASSESSMENT — PAIN SCALES - GENERAL
PAINLEVEL_OUTOF10: 0 - NO PAIN
PAINLEVEL_OUTOF10: 9

## 2023-10-06 ASSESSMENT — PAIN SCALES - PAIN ASSESSMENT IN ADVANCED DEMENTIA (PAINAD)
BODYLANGUAGE: RELAXED
CONSOLABILITY: NO NEED TO CONSOLE
FACIALEXPRESSION: SMILING OR INEXPRESSIVE
BREATHING: NORMAL
TOTALSCORE: 0

## 2023-10-06 NOTE — DISCHARGE SUMMARY
Discharge Summary    Admission Date: 10/5/2023  Discharge Date: 10/6/2023    Discharge Diagnosis  Fetal hydrops    Hospital Course  Delivery Date: 10/6/2023  1:31 AM   Delivery type: Vaginal, Spontaneous    GA at delivery: 22w0d  Outcome: Fetal Demise   Anesthesia during delivery:  Dilaudid and Toradol   Intrapartum complications: None        Patient had diagnosis of severe fetal hydrops. Presented for IOL after digoxin injection. She was induced with cytotec and had a  with spontaneous delivery of the placenta. Her blood loss was 100cc. By 10/6 she was recovering appropriately and was stable for discharge home with outpatient follow up     Contraception at discharge: Interval depo    Pertinent Physical Exam At Time of Discharge  General: Examination reveals a well developed, well nourished, female, whose affect is appropriate. She is alert and cooperative.  HEENT: PERRLA. External ears normal. Nose normal, no erythema or discharge. Mouth and throat clear.  Abdomen: abdomen soft.  Fundus: firm.  Extremities: no redness or tenderness in the calves or thighs, no edema.  Neurological: alert, oriented, normal speech, no focal findings or movement disorder noted.  Psychological: awake and alert; oriented to person, place, and time.    Discharge Meds     Your medication list        START taking these medications        Instructions Last Dose Given Next Dose Due   acetaminophen 325 mg tablet  Commonly known as: Tylenol      Take 3 tablets (975 mg) by mouth every 6 hours.       ibuprofen 600 mg tablet      Take 1 tablet (600 mg) by mouth every 6 hours.       polyethylene glycol 17 gram/dose powder  Commonly known as: Glycolax, Miralax      Take 17 g by mouth once daily. For constipation                 Where to Get Your Medications        These medications were sent to [facility]/pharmacy #[id number] - [facility] [address]   [address] [place] [id number]      Phone: [phone]   acetaminophen 325 mg tablet  ibuprofen  600 mg tablet  polyethylene glycol 17 gram/dose powder          Complications Requiring Follow-Up  Fetal hydrops     Test Results Pending At Discharge  Pending Labs       Order Current Status    Surgical Pathology Exam - PLACENTA Collected (10/06/23 8640)    Fetal/Matern Scr In process    Type and Screen Preliminary result            Outpatient Follow-Up  No future appointments.        Lory Colon MD

## 2023-10-06 NOTE — SIGNIFICANT EVENT
Patient feeling increasing pressure    SVE 4-5/90/0  Will give IV dilaudid now  For cyto #6 at 0200     Latosha Wild MD  PGY-3, Obstetrics and Gynecology

## 2023-10-06 NOTE — PROGRESS NOTES
Postpartum Progress Note    Assessment/Plan   Brigid Lacey is a 23 y.o., , who delivered at 22w0d gestation and is now postpartum day 0 from  after digoxin injection and IOL for fetal hydrops.    Postpartum from IOL at 21w for fetal hydrops  - baby Scarlet at bedside  - patient declines autopsy  - Mortician to discuss disposition with family, will sign consent forms today    - pain well controlled, EBL 100cc from delivery, lochia light  - Breavement Mariel Camacho consulted     Principal Problem:    Fetal hydrops    Pregnancy Problems (from 10/03/23 to present)       Problem Noted Resolved    Hydrops fetalis 10/3/2023 by Turner Ingram MD No    Priority:  Medium      Overview Addendum 10/5/2023  2:57 AM by Latosha Wild MD     THANIA from [facility] where it was diagnosed at 20w.  Patient underwent an amnio: Negative for Parvo, CMV, Toxo, FISH 46XX  Desires termination           Termination of pregnancy (fetus) 10/3/2023 by Turner Ingram MD No    Priority:  Medium      Overview Signed 10/3/2023  3:37 PM by Turner Ingram MD     Hydrops - skin edema, pleural effusion, pericardial effusion, ascites               Hospital course:     Subjective   Her pain is well controlled with current medications  She is passing flatus  She is ambulating well  She is tolerating a Adult diet Regular  She has support with partner bedside   She  is coping appropriately  today   Her plan for contraception is pending         Objective   Allergies:   Patient has no known allergies.         Last Vitals:  Temp Pulse Resp BP MAP Pulse Ox   36.6 °C (97.9 °F) 89 20 115/78 97 mmHg 97 %     Vitals Min/Max Last 24 Hours:  Temp  Min: 36.4 °C (97.5 °F)  Max: 37.5 °C (99.5 °F)  Pulse  Min: 84  Max: 139  Resp  Min: 14  Max: 20  BP  Min: 106/60  Max: 138/79  MAP  Min: 93 mmHg  Max: 97 mmHg    Intake/Output:     Intake/Output Summary (Last 24 hours) at 10/6/2023 0901  Last data filed at 10/6/2023 0600  Gross per 24 hour   Intake 2881.25 ml   Output 100  ml   Net 2781.25 ml       Physical Exam:  General: Examination reveals a well developed, well nourished, female, in no acute distress. She is alert and cooperative.  HEENT: PERRLA. External ears normal. Nose normal, no erythema or discharge. Mouth and throat clear.  Abdomen: soft.  Fundus: firm.  Extremities: no redness or tenderness in the calves or thighs, no edema.  Neurological: alert, oriented, normal speech, no focal findings or movement disorder noted.  Psychological: awake and alert; oriented to person, place, and time.    Lab Data:  Labs in chart were reviewed.

## 2023-10-06 NOTE — PROGRESS NOTES
Intrapartum Progress Note    Assessment/Plan   Brigid Lacey is a 23 y.o.  at 21w6d. GERTRUDE: 2024, by Last Menstrual Period presenting for IOL in the setting of fetal hydrops.     IOL, IUFD  - Patient with fetal hydrops diagnosed on anatomy ultrasound with negative amniocentesis  - s/p MFM consult: given risk to maternal life risk of mirror syndrome, patient opted for termination. Now s/p fetal digoxin injection and unsuccessful laminaria placement, with patient now opting for IOL  - Bedside ultrasound with hydropic fetus in cephalic presentation, no cardiac activity visualized on admission   - Patient is s/p genetic testing with amniocentesis with pending whole genome sequencing. Patient desires autopsy, undecided on disposition. Plan to contact mortician after delivery.   - Cytotec 400mcg placed intravaginally now x5 doses   - Do not recommend  given increased maternal risk associated with  vs vaginal delivery without benefit to fetus in this setting  - Discussed increased risk for retained placenta and need for D&C after delivery  - Hgb 9.2 on admission, T&C 2u pRBC  - Patient would like to hold Scarlet after delivery   - Pain control with toradol/dilaudid, neuraxial anesthesia as desired   - Bereavement Shellie Camacho consulted, appreciate care   - SVE 3/90/-3    Principal Problem:    Fetal hydrops    Pregnancy Problems (from 10/03/23 to present)       Problem Noted Resolved    Hydrops fetalis 10/3/2023 by Turner Ingram MD No    Priority:  Medium      Overview Addendum 10/5/2023  2:57 AM by Latosha Wild MD     THANIA from [facility] where it was diagnosed at 20w.  Patient underwent an amnio: Negative for Parvo, CMV, Toxo, FISH 46XX  Desires termination           Termination of pregnancy (fetus) 10/3/2023 by Turner Ingram MD No    Priority:  Medium      Overview Signed 10/3/2023  3:37 PM by Turner Ingram MD     Hydrops - skin edema, pleural effusion, pericardial effusion, ascites                  Subjective   Having some bloody discharge, uncertain if water is broken. Coping well.     Objective   Last Vitals:  Temp Pulse Resp BP MAP Pulse Ox   37.2 °C (99 °F) 110 18 130/80 97 mmHg 100 %     Vitals Min/Max Last 24 Hours:  Temp  Min: 36.4 °C (97.5 °F)  Max: 37.2 °C (99 °F)  Pulse  Min: 91  Max: 120  Resp  Min: 16  Max: 18  BP  Min: 113/72  Max: 130/80  MAP  Min: 93 mmHg  Max: 97 mmHg    Intake/Output:    Intake/Output Summary (Last 24 hours) at 10/5/2023 2316  Last data filed at 10/5/2023 1200  Gross per 24 hour   Intake 1022.92 ml   Output --   Net 1022.92 ml       Physical Examination:  General: no acute distress  HEENT: normocephalic, atraumatic  Heart: warm and well perfused  Lungs: clear to auscultation bilaterally  Abdomen: gravid  Extremities: moving all extremities  Neuro: awake and conversant  Psych: appropriate mood and affect  SVE: 3/90/-3, membranes intact    Lab Review:  Lab Results   Component Value Date    ABO A 10/05/2023    LABRH NEG 10/05/2023    ABSCRN POS 10/05/2023     Lab Results   Component Value Date    WBC 8.6 10/05/2023    HGB 9.2 (L) 10/05/2023    HCT 27.8 (L) 10/05/2023     10/05/2023

## 2023-10-06 NOTE — L&D DELIVERY NOTE
OB Delivery Note  10/6/2023  Brigid Lacey  23 y.o.     Gestational Age: 22w0d  /Para:   Estimated Blood Loss:   Delivery Blood Loss  10/05/23 0215 - 10/06/23 0516      Quantitative Blood Loss - Vaginal (mL) Hospital Encounter 100 mL    Total  100 mL          Quantitative Blood Loss: 100 mL    Alexia Lacey FD [[id number]]      Labor Events    Sac identifier: Sac 1  Rupture date/time: 10/6/2023 0131  Rupture type: Spontaneous  Fluid color: Bloody  Fluid odor: None  Labor type: Spontaneous Onset of Labor  Labor allowed to proceed with plans for an attempted vaginal birth?: Yes  Augmentation: None  Complications: None       Labor Event Times    Labor onset date/time: 10/5/2023 0215  Dilation complete date/time: 10/6/2023 0130  Start pushing date/time: 10/6/2023 0130       Labor Length    1st stage: 23h 15m  2nd stage: 0h 01m  3rd stage: 2h 09m       Placenta    Placenta delivery date/time: 10/6/2023 0340  Placenta removal: Spontaneous  Placenta appearance: Intact  Placenta disposition: pathology       Cord    Vessels: Unknown  Complications: None  Delayed cord clamping?: No  Gases sent?: No  Stem cell collection (by provider): No       Lacerations    Episiotomy: None  Perineal laceration: None  Repair suture: None       Anesthesia    Method: None  Analgesics: FENTANYL       Operative Delivery    Forceps attempted?: No  Vacuum extractor attempted?: No       Shoulder Dystocia    Shoulder dystocia present?: No       L[place] Delivery    Time head delivered: 10/6/2023 01:31:00  Birth date/time: 10/6/2023 01:31:00  Delivery type: Vaginal, Spontaneous  Complications: None       Apgars    Living status: Fetal Demise  Apgar Component Scores:  1 min.:  5 min.:  10 min.:  15 min.:  20 min.:    Skin color:  0  0  0  0  0    Heart rate:  0  0  0  0  0    Reflex irritability:  0  0  0  0  0    Muscle tone:  0  0  0  0  0    Respiratory effort:  0  0  0  0  0    Total:  0  0  0  0  0           Delivery Providers     Delivering clinician: Charmaine Solis MD   Provider Role    Shaka Nair, RN Delivery Nurse    Peterson Park, RN Nursery Nurse    Latosha Wild MD Resident               Hydropic fetus delivered intact without signs of life. Pitocin bolus and two doses of hemabate 0.25mg were given as uterotonics. Placenta delivered with gentle traction after two hours, intact.     Latosha Wild MD

## 2023-10-06 NOTE — PROGRESS NOTES
10/6/23: Informed by OB team that patient delivered overnight. Met with pt and her significant other this morning at bedside. They were both appropriately tearful. Scarlet was in the room with them - the staff overnight were able to take pictures and do some memory making. They are currently spending time with her but would like to go home later today. Per Brigid, she is looking forward to sleeping in her own bed. Spoke with them about different bereavement resources including the [employer] which can provide copayment assistance with stillbirths. Brigid agreeable to doing the application for the [employer] with me and will spend me any bills that she receives from this hospital stay. Confirmed they have my direct contact information. Brigid agreeable to calls from me to check in.    PLAN: Will remain available for bereavement support and resources prn.    Mariel Camacho, MANJULA, KENZIE, CGP  [employer] [facility] Director of Parent Bereavement Programs  [phone]

## 2023-10-06 NOTE — CARE PLAN
The patient's goals for the shift include      The clinical goals for the shift include      Problem: Vaginal Birth or  Section  Goal: Demonstrates labor coping techniques through delivery  Outcome: Met  Goal: Minimal s/sx of HDP and BP<160/110  Outcome: Met  Goal: No s/sx of infection through recovery  Outcome: Met  Goal: No s/sx of hemorrhage through recovery  Outcome: Met     Problem: Postpartum  Goal: No s/sx infection  Outcome: Met  Goal: No s/sx of hemorrhage  Outcome: Met  Goal: Minimal s/sx of HDP and BP<160/110  Outcome: Met     Problem:  Loss  Goal: Appropriate  loss/grief for pt/family  Outcome: Met  Goal: Demonstrated coping  Outcome: Met     Problem: Pain - Adult  Goal: Verbalizes/displays adequate comfort level or baseline comfort level  Outcome: Met     Problem: Safety - Adult  Goal: Free from fall injury  Outcome: Met     Problem: Discharge Planning  Goal: Discharge to home or other facility with appropriate resources  Outcome: Met

## 2023-10-09 LAB
BLOOD EXPIRATION DATE: NORMAL
BLOOD EXPIRATION DATE: NORMAL
DISPENSE STATUS: NORMAL
DISPENSE STATUS: NORMAL
PRODUCT BLOOD TYPE: 600
PRODUCT BLOOD TYPE: 9500
PRODUCT CODE: NORMAL
PRODUCT CODE: NORMAL
UNIT ABO: NORMAL
UNIT ABO: NORMAL
UNIT NUMBER: NORMAL
UNIT NUMBER: NORMAL
UNIT RH: NORMAL
UNIT RH: NORMAL
UNIT VOLUME: 350
UNIT VOLUME: 350
XM INTEP: NORMAL
XM INTEP: NORMAL

## 2023-10-09 NOTE — SIGNIFICANT EVENT
Follow-up Phone Call Note:   Interview:  Care Type: Women's Health   Phone Number Call  [phone]   Call Outcome: connected with patient   Patient Reports Feeling (symptoms) Are: same   Which Meds Were New Meds: yes   Which Meds to Continue: yes   Which Meds to Stop: no medications were discontinued   Who participated in medication reconciliation with the hospital staff?: you   In your professional opinion do you think there was a medication discrepancy or potential for medication discrepancy in this situation?: no   Medication Issues: no medication issues   Discharge Instructions Clear: yes   Patient Has a Primary Care Provider: yes   Post-hospitalization Follow-up Occurred According To Schedule:   appointment not scheduled, encouraged patient to call for an appointment    Delivered Baby(ies): yes   Chest Pain: no   Shortness of breath or difficulty breathing: no   Seizures: no   Any thoughts of hurting yourself or your baby: no   Bleeding that is soaking through one pad/hour or blood clots the size of an egg or bigger: no   Incision that is not healing: no   Red or swollen leg that is painful or warm to the touch: no   Temperature of 100.4F or higher: no   Headache that does not get better, even after taking medicine, or a bad headache with vision changes: no   Where or in what is your baby sleeping?: loss   Patient Has Plan Specific Name And Number Of Who To Call For Concerns: yes   Stroke Patient: no  Comments: Patient states she has good supports at home   Date/Time Of Call: 10/09/2023 at 1253   Call Back Done By: care coordinator   Callback Complete: yes

## 2023-10-16 LAB
CLINICAL SIG UPDATED INFO: NORMAL
LABORATORY COMMENT REPORT: NORMAL
PATH REPORT.COMMENTS IMP SPEC: NORMAL
PATH REPORT.FINAL DX SPEC: NORMAL
PATH REPORT.GROSS SPEC: NORMAL
PATH REPORT.RELEVANT HX SPEC: NORMAL
PATH REPORT.TOTAL CANCER: NORMAL

## 2023-10-19 LAB
LABORATORY COMMENT REPORT: NORMAL
PATH REPORT.ADDENDUM SPEC: NORMAL
PATH REPORT.FINAL DX SPEC: NORMAL
PATH REPORT.RELEVANT HX SPEC: NORMAL
PATH REPORT.TOTAL CANCER: NORMAL
RPT COMMENT SECTION: NORMAL

## 2023-11-06 ENCOUNTER — DOCUMENTATION (OUTPATIENT)
Dept: CASE MANAGEMENT | Facility: HOSPITAL | Age: 23
End: 2023-11-06
Payer: COMMERCIAL

## 2023-11-06 NOTE — PROGRESS NOTES
Brigid Lacey is a 23 y.o.  admitted on 10/05/2023 at 21w6d. GERTRUDE: 2024, by Last Menstrual Period presenting for IOL in the setting of fetal hydrops/IUFD. SW Intern, Barrington Mary, called Brigid to follow up for bereavement support services after meeting w/Brigid briefly at bedside during 10/05/23 admission. SW intern LVM with Mariel Camacho's direct contact information.    PLAN: Will continue to be available for bereavement support and services as needed.      23 at 11:45 AM - Barrington Mary      This note has been reviewed and approved by Barrington Mary's supervisor:  Mariel Camacho, MANJULA, LISW, CGP  Director of Parent Bereavement Programs    Please call [phone] or secure chat Mariel Camacho with any questions or concerns.